# Patient Record
Sex: FEMALE | URBAN - METROPOLITAN AREA
[De-identification: names, ages, dates, MRNs, and addresses within clinical notes are randomized per-mention and may not be internally consistent; named-entity substitution may affect disease eponyms.]

---

## 2021-03-10 ENCOUNTER — OFFICE VISIT (OUTPATIENT)
Dept: URBAN - METROPOLITAN AREA CLINIC 40 | Facility: CLINIC | Age: 76
End: 2021-03-10
Payer: MEDICARE

## 2021-03-10 DIAGNOSIS — G35 MULTIPLE SCLEROSIS: ICD-10-CM

## 2021-03-10 DIAGNOSIS — H02.834 DERMATOCHALASIS OF LEFT UPPER EYELID: Primary | ICD-10-CM

## 2021-03-10 PROCEDURE — 99204 OFFICE O/P NEW MOD 45 MIN: CPT | Performed by: OPTOMETRIST

## 2021-03-10 ASSESSMENT — INTRAOCULAR PRESSURE
OD: 25
OS: 24

## 2021-03-10 ASSESSMENT — KERATOMETRY
OD: 43.13
OS: 42.75

## 2021-03-10 NOTE — IMPRESSION/PLAN
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 Plan: Discussed diagnosis in detail with patient. Will continue to observe condition and or symptoms. Not visually significant.

## 2023-11-29 ENCOUNTER — OFFICE VISIT (OUTPATIENT)
Dept: URBAN - METROPOLITAN AREA CLINIC 40 | Facility: CLINIC | Age: 78
End: 2023-11-29
Payer: MEDICARE

## 2023-11-29 DIAGNOSIS — H02.834 DERMATOCHALASIS OF LEFT UPPER EYELID: Primary | ICD-10-CM

## 2023-11-29 DIAGNOSIS — H04.123 DRY EYE SYNDROME OF BILATERAL LACRIMAL GLANDS: ICD-10-CM

## 2023-11-29 DIAGNOSIS — G35 MULTIPLE SCLEROSIS: ICD-10-CM

## 2023-11-29 PROCEDURE — 99214 OFFICE O/P EST MOD 30 MIN: CPT | Performed by: OPTOMETRIST

## 2023-11-29 RX ORDER — VARENICLINE 0.03 MG/.05ML
SPRAY NASAL
Qty: 8.4 | Refills: 11 | Status: INACTIVE
Start: 2023-11-29 | End: 2024-01-15

## 2023-11-29 ASSESSMENT — INTRAOCULAR PRESSURE
OD: 23
OS: 23

## 2023-11-29 ASSESSMENT — KERATOMETRY
OS: 42.75
OD: 43.00